# Patient Record
Sex: FEMALE | Race: WHITE
[De-identification: names, ages, dates, MRNs, and addresses within clinical notes are randomized per-mention and may not be internally consistent; named-entity substitution may affect disease eponyms.]

---

## 2018-01-27 ENCOUNTER — HOSPITAL ENCOUNTER (EMERGENCY)
Dept: HOSPITAL 59 - ER | Age: 53
Discharge: HOME | End: 2018-01-27
Payer: COMMERCIAL

## 2018-01-27 DIAGNOSIS — F17.210: ICD-10-CM

## 2018-01-27 DIAGNOSIS — R05: ICD-10-CM

## 2018-01-27 DIAGNOSIS — B34.9: Primary | ICD-10-CM

## 2018-01-27 PROCEDURE — 99283 EMERGENCY DEPT VISIT LOW MDM: CPT

## 2018-01-27 PROCEDURE — 71046 X-RAY EXAM CHEST 2 VIEWS: CPT

## 2018-01-27 NOTE — EMERGENCY DEPARTMENT RECORD
History of Present Illness





- General


Chief complaint: Flu Like Symptoms


Stated complaint: FLU -LIKE SYMPTOMS


Time Seen by Provider: 18 15:08


Source: Patient, RN notes reviewed


Mode of Arrival: Ambulatory





- History of Present Illness


Initial comments: 


cough and congestion and two courses of antibiotics and neg flu test by Dr. Ramirez. and not getting better





Onset/Timin


-: Month(s)


Severity: Moderate


Severity scale (1-10): 7


Quality: Aching


Consistency: Constant





- Rodney Coma Scale


Eye Response: (4) Open spontaneously


Motor Response: (6) Obeys commands


Verbal Response: (5) Oriented


Rodney Total: 15





- Related Data


 Home Medications











 Medication  Instructions  Recorded  Confirmed  Last Taken


 


Albuterol Sulfate 0.083% [Neb] 3 ml NEB .EVERY 4-6 HOURS PRN 18 

1 Day Ago





    ~18


 


Albuterol Sulfate [Proair Hfa] 1 - 2 puff IH .EVERY 4-6 HOURS PRN 18 1 Day Ago





    ~18








 Previous Rx's











 Medication  Instructions  Recorded


 


Prednisone [Prednisone 10Mg] 10 mg PO ASDIR #30 tab 18











 Allergies











Allergy/AdvReac Type Severity Reaction Status Date / Time


 


codeine Allergy Unknown HEADACHE Verified 18 15:50


 


hydrocodone Allergy  VOMITING Verified 18 15:50














Travel Screening





- Travel/Exposure Within Last 30 Days


Have you traveled within the last 30 days?: No





- Travel/Exposure Within Last Year


Have you traveled outside the [place] in the last year?: No





- Additonal Travel Details


Have you been exposed to anyone with a communicable illness?: No





- Travel Symptoms


Symptom Screening: None





Review of Systems


Reviewed: No additional complaints except as noted below


Constitutional: Reports: As per HPI.  Denies: Chills, Fever, Malaise, Night 

sweats, Weakness, Weight change


Eyes: Reports: As per HPI.  Denies: Eye discharge, Eye pain, Photophobia, 

Vision change


ENT: Reports: As per HPI, Congestion.  Denies: Dental pain, Ear pain, Epistaxis

, Hearing loss, Throat pain


Respiratory: Reports: As per HPI, Cough.  Denies: Dyspnea, Hemoptysis, Stridor, 

Wheezes


Cardiovascular: Reports: As per HPI.  Denies: Arrhythmia, Chest pain, Dyspnea 

on exertion, Edema, Murmurs, Orthopnea, Palpitations, Paroxysmal nocturnal 

dyspnea, Rheumatic Fever, Syncope


Endocrine: Reports: As per HPI.  Denies: Fatigue, Heat or cold intolerance, 

Polydipsia, Polyuria


Gastrointestinal: Reports: As per HPI.  Denies: Abdominal pain, Constipation, 

Diarrhea, Hematemesis, Hematochezia, Melena, Nausea, Vomiting


Genitourinary: Reports: As per HPI.  Denies: Abnormal menses, Discharge, 

Dyspareunia, Dysuria, Frequency, Hematuria, Incontinence, Retention, Urgency


Musculoskeletal: Reports: As per HPI.  Denies: Arthralgia, Back pain, Gout, 

Joint swelling, Myalgia, Neck pain


Skin: Reports: As per HPI.  Denies: Bruising, Change in color, Change in hair/

nails, Lesions, Pruritus, Rash


Neurological: Reports: As per HPI.  Denies: Abnormal gait, Confusion, Headache, 

Numbness, Paresthesias, Seizure, Tingling, Tremors, Vertigo, Weakness


Psychiatric: Reports: As per HPI.  Denies: Anxiety, Auditory hallucinations, 

Depression, Homicidal thoughts, Suicidal thoughts, Visual hallucinations


Hematological/Lymphatic: Reports: As per HPI.  Denies: Anemia, Blood Clots, 

Easy bleeding, Easy bruising, Swollen glands





Past Medical History





- SOCIAL HISTORY


Smoking Status: Current every day smoker


Alcohol Use: None


Drug Use: None





- RESPIRATORY


Hx Respiratory Disorders: Yes


Hx Asthma: Yes


Hx Sleep Apnea: Yes


Hx of CPAP: Yes





- CARDIOVASCULAR


Hx Cardio Disorders: No





- NEURO


Hx Neuro Disorders: Yes


Hx Headaches: Yes


Hx of Migraines: Yes (2016)





- GI


Hx GI Disorders: Yes


Hx Reflux: Yes


Hx Nausea/Vomiting: Yes


Comment:: c/o diarrhea





- 


Hx Genitourinary Disorders: Yes


Hx Bladder Problem: Yes


Comment:: LMP 3 years ago





- ENDOCRINE


Hx Endocrine Disorders: No





- MUSCULOSKELETAL


Hx Musculoskeletal Disorders: No





- PSYCH


Hx Psych Problems: Yes


Hx Anxiety: Yes


Hx Depression: Yes





- HEMATOLOGY/ONCOLOGY


Hx Hematology/Oncology Disorders: No





Family Medical History


Any Significant Family History?: Yes


Hx Cancer: Father, Grandparents


Hx Heart Disease: Mother


Hx HTN: Mother





Physical Exam





- General


General Appearance: Alert, Oriented x3, Cooperative, No acute distress





- Head


Head exam: Normal inspection





- Eye


Eye exam: Normal appearance, PERRL


Pupils: Normal accommodation





- ENT


ENT exam: Normal exam, Mucous membranes moist, Normal external ear exam, Normal 

orophraynx, TM's normal bilaterally


Ear exam: Normal external inspection.  negative: External canal tenderness


Nasal Exam: Normal inspection.  negative: Discharge, Sinus tenderness


Mouth exam: Normal external inspection, Tongue normal


Teeth exam: Normal inspection.  negative: Dental caries


Throat exam: Normal inspection.  negative: Tonsillar erythema, Tonsillar exudate





- Neck


Neck exam: Normal inspection, Full ROM.  negative: Tenderness





- Respiratory


Respiratory exam: Normal lung sounds bilaterally.  negative: Respiratory 

distress





- Cardiovascular


Cardiovascular Exam: Regular rate, Normal rhythm, Normal heart sounds





- GI/Abdominal


GI/Abdominal exam: Soft, Normal bowel sounds.  negative: Tenderness





- Rectal


Rectal exam: Deferred





- 


 exam: Deferred





- Extremities


Extremities exam: Normal inspection, Full ROM, Normal capillary refill.  

negative: Tenderness





- Back


Back exam: Reports: Normal inspection, Full ROM.  Denies: Muscle spasm, Rash 

noted, Tenderness





- Neurological


Neurological exam: Alert, Normal gait, Oriented X3, Reflexes normal





- Psychiatric


Psychiatric exam: Normal affect, Normal mood





- Skin


Skin exam: Dry, Intact, Normal color, Warm





Course





 Vital Signs











  18





  14:32


 


Temperature 98.2 F


 


Pulse Rate 74


 


Respiratory 20





Rate 


 


Blood Pressure 121/68


 


Pulse Ox 98














- Reevaluation(s)


Reevaluation #1: 


patient has hyperactive airway and will start prednisone


18 15:49








Medical Decision Making





- Data Complexity


MDM Data: X-Ray Ordered and/or Reviewed (negative chest xray)





Disposition


Clinical Impression: 


 Viral syndrome





Disposition: Home, Self-Care


Condition: (1) Good


Instructions:  Viral Syndrome (ED)


Additional Instructions: 


follow up with Dr. Ramirez in 3 -4 days


prednisone 40 mg for three days than 30 mg for three days than 20 mg for thr 

days than 10 mg for three days


Prescriptions: 


Prednisone [Prednisone 10Mg] 10 mg PO ASDIR #30 tab


Forms:  Patient Portal Access


Time of Disposition: 15:49





Quality





- Quality Measures


Quality Measures: N/A





- Blood Pressure Screening


Does Patient Have Any of the Following: No


Blood Pressure Classification: Pre-Hypertensive BP Reading


Systolic Measurement: 121


Diastolic Measurement: 68


Screening for High Blood Pressure: < Pre-Hypertensive BP, F/U Documented > [

]


Pre-Hypertensive Follow-up Interventions: Referral to alternative/primary care 

provider.

## 2018-01-28 NOTE — RADIOLOGY REPORT
EXAM:  CHEST 2 VIEWS



HISTORY:  PERSISTENT COUGH FOR THE PAST MONTH. BODY ACHES.



TECHNIQUE:  PA and lateral upright views of the chest were obtained.



COMPARISON:  09/29/2014.



FINDINGS:  The heart, mediastinum, and pulmonary vasculature are normal. There 
is minor atelectasis or infiltrate within the lung bases bilaterally. The upper 
lung fields are clear. There is no pneumothorax or effusion. The bones appear 
intact.



IMPRESSION:  



MILD ATELECTASIS OR INFILTRATE AT THE LUNG BASES BILATERALLY, LEFT GREATER THAN 
RIGHT. 



JOB NUMBER:  [id number]
[facility]

## 2018-02-27 ENCOUNTER — HOSPITAL ENCOUNTER (OUTPATIENT)
Dept: HOSPITAL 59 - SUR | Age: 53
Discharge: HOME | End: 2018-02-27
Attending: UROLOGY
Payer: COMMERCIAL

## 2018-02-27 DIAGNOSIS — J45.909: ICD-10-CM

## 2018-02-27 DIAGNOSIS — F41.8: ICD-10-CM

## 2018-02-27 DIAGNOSIS — N39.46: Primary | ICD-10-CM

## 2018-02-27 DIAGNOSIS — F17.210: ICD-10-CM

## 2018-02-27 PROCEDURE — 57288 REPAIR BLADDER DEFECT: CPT

## 2018-02-27 PROCEDURE — 00910 ANES TRANSURETHRAL PX NOS: CPT

## 2018-02-28 NOTE — OPERATIVE NOTE
DATE OF SURGERY: 02/27/2018



PREOPERATIVE DIAGNOSIS: Mixed urinary incontinence, predominantly stress component. 



POSTOPERATIVE DIAGNOSIS: Mixed urinary incontinence, predominantly stress component. 



OPERATION: Transobturator mid urethral sling with Mina mesh and cystoscopy. 



Anesthesia: General. 



Surgeon: Raghu Dillon MD 



Assistant: None. 



Indication: A 52-year-old female with history of incontinence and workup has revealed predominantly 
stress type leaking. We discussed various management options for her incontinence and ultimately she 
has decided to move ahead with a transobturator sling. We discussed the surgery in detail including 
potential risks preoperatively including pain, bleeding, infection, iatrogenic injury, urinary 
retention, recurring incontinence, and mesh-related complications including erosion, especially 
highlighting the fact to her that as a smoker, she is at a heightened risk for wound healing and 
mesh erosion complications. She indicated understanding and wanted to proceed as discussed. 



PROCEDURE: Preop informed consent was obtained. Antibiotics were given. Sedation was administered. 
The patient was brought to the operating room and given general anesthetic and carefully placed in 
the lithotomy position. Genitalia were clipped, prepped and draped and in the usual sterile fashion. 
Meek catheter was placed in the bladder, and the bladder was completely emptied. Stay sutures 2-0 
silk were used to expose the labia and a weighted speculum was placed in the vagina. The bladder 
neck was identified and marked as well as the urethral meatus, and a midline suburethral incision 
was made FCI between the meatus and the bladder neck. This was carried down carefully with 
lateral blunt dissection to expose the space beneath the pubic rami on either side without 
difficulty. The wound was irrigated with antibiotics at that point. Our attention turned to placing 
the needles. 



The transobturator needles, using the helical needles contained in the Mina mesh system were used. 
Thigh incision made just below the attachment of the adductor longus tendon was made on either side 
roughly corresponding to the level of the clitoris. With one finger in the vaginal incision 
underneath the pubic rami, each needle was guided around and underneath the pubic ramus and guided 
out of the vaginal incision taking care to stay within the vaginal incision. After the needles were 
placed, cystoscopy was performed. There was no evidence of any bladder violation. Both ureters were 
identified and were effluxing clear yellow urine. The tape was then secured to each needle and then 
backed out to snug the sling up to the mid urethra. The area was then irrigated with copious amounts 
of antibiotic solution and it appeared that I had good location of the sling and without undue 
tension, but after I released the tapes at the level of the skin incision, it appeared to be that 
the sling was too loose. At that point, I removed that sling and performed the exact procedure all 
over again including the cystoscopy to confirm there was no bladder or ureteral injury. The second 
time the sling was left in excellent position in the mid urethra without any undue tension on the 
urethra but also not loose. This was confirmed using a curved Asher scissors between the urethra and 
the sling. The area was then irrigated out with antibiotics again and the vaginal incision was 
closed with running 2-0 Vicryl. The thigh incisions were closed with Dermabond and the bladder was 
filled with approximately 200 mL of sterile water before removing the Meek catheter and waking the 
patient. She was transferred to recovery in stable condition. Sponge, needle, and instrument counts 
correct at the end of the case. There were no intraoperative complications noted. 



PLAN: The patient will be given a voiding trial in recovery. If she is unable to void successfully, 
Meek catheter will be placed and she will follow up in the office later this week. If she is able 
to void successfully today, then she will be following up in the [facility] in 1 
week to check postvoid residual and review her symptoms. She was given explicit postoperative 
instructions and care instructions, and [place] paperwork was also filled out as well. CC: Dr. Ashley ASENCIO

## 2018-07-13 ENCOUNTER — HOSPITAL ENCOUNTER (OUTPATIENT)
Dept: HOSPITAL 59 - ER | Age: 53
Setting detail: OBSERVATION
LOS: 1 days | Discharge: HOME | End: 2018-07-14
Attending: INTERNAL MEDICINE | Admitting: INTERNAL MEDICINE
Payer: COMMERCIAL

## 2018-07-13 DIAGNOSIS — J45.909: ICD-10-CM

## 2018-07-13 DIAGNOSIS — F41.9: ICD-10-CM

## 2018-07-13 DIAGNOSIS — M19.90: ICD-10-CM

## 2018-07-13 DIAGNOSIS — G47.30: ICD-10-CM

## 2018-07-13 DIAGNOSIS — K21.9: ICD-10-CM

## 2018-07-13 DIAGNOSIS — R79.89: ICD-10-CM

## 2018-07-13 DIAGNOSIS — R07.9: Primary | ICD-10-CM

## 2018-07-13 DIAGNOSIS — F17.210: ICD-10-CM

## 2018-07-13 LAB
ALBUMIN SERPL-MCNC: 4.4 G/DL (ref 4–5)
ALBUMIN/GLOB SERPL: 1.5 {RATIO} (ref 1.1–1.8)
ALP SERPL-CCNC: 89 U/L (ref 35–104)
ALT SERPL-CCNC: 20 U/L (ref ?–33)
ANION GAP SERPL CALC-SCNC: 13 MMOL/L (ref 7–16)
AST SERPL-CCNC: 23 U/L (ref 10–35)
BASOPHILS NFR BLD: 0.5 % (ref 0–6)
BILIRUB SERPL-MCNC: 0.2 MG/DL (ref 0.2–1)
BUN SERPL-MCNC: 15 MG/DL (ref 6–20)
CHOLEST SERPL-MCNC: 219 MG/DL (ref ?–200)
CK MB SERPL-MCNC: 2.4 NG/ML (ref ?–3.77)
CK MB SERPL-MCNC: 3.1 NG/ML (ref ?–3.77)
CO2 SERPL-SCNC: 26 MMOL/L (ref 22–29)
CREAT SERPL-MCNC: 0.8 MG/DL (ref 0.5–0.9)
CREATINE PHOSPHOKINASE: 146 U/L (ref 26–192)
EOSINOPHIL NFR BLD: 1.4 % (ref 0–6)
ERYTHROCYTE [DISTWIDTH] IN BLOOD BY AUTOMATED COUNT: 13.6 % (ref 11.5–14.5)
EST GLOMERULAR FILTRATION RATE: > 60 ML/MIN
GLOBULIN SER-MCNC: 3 GM/DL (ref 1.4–4.8)
GLUCOSE SERPL-MCNC: 87 MG/DL (ref 74–109)
GRANULOCYTES NFR BLD: 52.1 % (ref 47–80)
HCT VFR BLD CALC: 45.3 % (ref 35–47)
HDLC SERPL-MCNC: 70 MG/DL (ref 40–60)
HGB BLD-MCNC: 15 GM/DL (ref 11.6–16)
LDL CHOLESTEROL/MEASURED: 134 MG/DL (ref 0–100)
LYMPHOCYTES NFR BLD AUTO: 36.9 % (ref 16–45)
MCH RBC QN AUTO: 31.9 PG (ref 27–33)
MCHC RBC AUTO-ENTMCNC: 33.1 G/DL (ref 32–36)
MCV RBC AUTO: 96.4 FL (ref 81–97)
MONOCYTES NFR BLD: 9.1 % (ref 0–9)
NTPRO B-NATRIURETIC PEPTIDE: 22.65 PG/ML (ref ?–125)
PLATELET # BLD: 211 K/UL (ref 130–400)
PMV BLD AUTO: 11.4 FL (ref 7.4–10.4)
PROT SERPL-MCNC: 7.4 G/DL (ref 6.6–8.7)
RBC # BLD AUTO: 4.7 M/UL (ref 3.8–5.4)
TRIGL SERPL-MCNC: 267 MG/DL (ref ?–150)
VLDLC SERPL CALC-MCNC: 53 MG/DL (ref 10–40)
WBC # BLD AUTO: 6.3 K/UL (ref 4.2–12.2)

## 2018-07-13 PROCEDURE — 85025 COMPLETE CBC W/AUTO DIFF WBC: CPT

## 2018-07-13 PROCEDURE — 82553 CREATINE MB FRACTION: CPT

## 2018-07-13 PROCEDURE — 99217: CPT

## 2018-07-13 PROCEDURE — 83880 ASSAY OF NATRIURETIC PEPTIDE: CPT

## 2018-07-13 PROCEDURE — 99285 EMERGENCY DEPT VISIT HI MDM: CPT

## 2018-07-13 PROCEDURE — 94761 N-INVAS EAR/PLS OXIMETRY MLT: CPT

## 2018-07-13 PROCEDURE — 84484 ASSAY OF TROPONIN QUANT: CPT

## 2018-07-13 PROCEDURE — 93005 ELECTROCARDIOGRAM TRACING: CPT

## 2018-07-13 PROCEDURE — 99220: CPT

## 2018-07-13 PROCEDURE — 93010 ELECTROCARDIOGRAM REPORT: CPT

## 2018-07-13 PROCEDURE — 80061 LIPID PANEL: CPT

## 2018-07-13 PROCEDURE — 71275 CT ANGIOGRAPHY CHEST: CPT

## 2018-07-13 PROCEDURE — 85379 FIBRIN DEGRADATION QUANT: CPT

## 2018-07-13 PROCEDURE — 80053 COMPREHEN METABOLIC PANEL: CPT

## 2018-07-13 PROCEDURE — 82550 ASSAY OF CK (CPK): CPT

## 2018-07-13 RX ADMIN — ENOXAPARIN SODIUM SCH MG: 40 INJECTION SUBCUTANEOUS at 18:12

## 2018-07-13 RX ADMIN — NICOTINE SCH PATCH: 21 PATCH, EXTENDED RELEASE TOPICAL at 15:21

## 2018-07-13 RX ADMIN — ACETAMINOPHEN PRN MG: 500 TABLET ORAL at 18:17

## 2018-07-13 NOTE — HISTORY & PHYSICAL
History of Present Illness





- Date of Service


Date of Service for History & Physical: 07/13/18





- History of Present Illness


Admitting Diagnosis: chest pain


History of Present Illness: 


Mrs. Hernández is a 54 y/o female with presentation of left sided chest pain 

which began about one week ago. She describes the pain as a pressure and 

bloating which is associated with burping. The patient says that pain has been 

coming and going and she thought that it would resolve but it hasn't. She has 

no previous cardiac history and denies any family history of coronary artery 

disease. She denies palpitations, sweating, headache or palpitations during 

these episodes and her pain goes away spontaneously. The patient is a smoker,

using 1 pack daily for the past 46 years. 





On arrival to [facility] ED the patients's initial workup was insignificant except 

for elevation in D-dimer at 0.57. The patient had a CT of the thorax which is 

negative for PE. Troponin and, ECG and chest xray are not suggestive of acute 

coronary syndrome. The patient's pain has subsided since being administered 

Nitrostat. She will be admitted for serial troponin draws and observation. 





PCP: Dr. Ramirez 





Travel Screening





- Travel/Exposure Within Last 30 Days


Have you traveled within the last 30 days?: No





- Travel/Exposure Within Last Year


Have you traveled outside the [place] in the last year?: No





- Additonal Travel Details


Have you been exposed to anyone with a communicable illness?: No


Exposure Details:: many coworkers are from out of country, not usually working 

floor, but in b





- Travel Symptoms


Symptom Screening: None





Review of Systems


Constitutional: Reports: As per HPI.  Denies: Chills, Fever, Malaise, Night 

sweats, Weakness, Weight change


Eyes: Reports: As per HPI.  Denies: Eye discharge, Eye pain, Photophobia, 

Vision change


ENT: Reports: As per HPI.  Denies: Congestion, Dental pain, Ear pain, Epistaxis

, Hearing loss, Throat pain


Respiratory: Reports: As per HPI.  Denies: Cough, Dyspnea, Hemoptysis, Stridor, 

Wheezes


Cardiovascular: Reports: As per HPI, Chest pain.  Denies: Arrhythmia, Dyspnea 

on exertion, Edema, Murmurs, Orthopnea, Palpitations, Paroxysmal nocturnal 

dyspnea, Rheumatic Fever, Syncope


Endocrine: Reports: As per HPI.  Denies: Fatigue, Heat or cold intolerance, 

Polydipsia, Polyuria


Gastrointestinal: Reports: As per HPI, Nausea.  Denies: Abdominal pain, 

Constipation, Diarrhea, Hematemesis, Hematochezia, Melena, Vomiting


Genitourinary: Reports: As per HPI.  Denies: Abnormal menses, Discharge, 

Dyspareunia, Dysuria, Frequency, Hematuria, Incontinence, Retention, Urgency


Musculoskeletal: Reports: As per HPI.  Denies: Arthralgia, Back pain, Gout, 

Joint swelling, Myalgia, Neck pain


Skin: Reports: As per HPI.  Denies: Bruising, Change in color, Change in hair/

nails, Lesions, Pruritus, Rash


Neurological: Reports: As per HPI.  Denies: Abnormal gait, Confusion, Headache, 

Numbness, Paresthesias, Seizure, Tingling, Tremors, Vertigo, Weakness


Psychiatric: Reports: As per HPI.  Denies: Anxiety, Auditory hallucinations, 

Depression, Homicidal thoughts, Suicidal thoughts, Visual hallucinations


Hematological/Lymphatic: Reports: As per HPI.  Denies: Anemia, Blood Clots, 

Easy bleeding, Easy bruising, Swollen glands





Past Medical History





- SOCIAL HISTORY


Smoking Status: Current every day smoker


Alcohol Use: Occasional


Alcohol Use Comment: mixed


Drug Use: None





- RESPIRATORY


Hx Respiratory Disorders: Yes


Hx Asthma: Yes


Hx Sleep Apnea: Yes


Hx of CPAP: Yes





- CARDIOVASCULAR


Hx Cardio Disorders: Yes


Hx Chest Pain: Yes (this admission)





- NEURO


Hx Neuro Disorders: Yes


Hx of Migraines: Yes





- GI


Hx GI Disorders: Yes


Hx Reflux: Yes





- 


Hx Genitourinary Disorders: Yes


Hx Bladder Problem: Yes





- ENDOCRINE


Hx Endocrine Disorders: No


Hx Diabetes: No


Hx Thyroid Disease: No





- MUSCULOSKELETAL


Hx Musculoskeletal Disorders: Yes


Hx Arthritis: Yes (shoulders and back)





- PSYCH


Hx Psych Problems: Yes


Hx Anxiety: Yes


Hx Depression: Yes


Comment:: medication





- HEMATOLOGY/ONCOLOGY


Hx Hematology/Oncology Disorders: No





Family Medical History


Any Significant Family History?: Yes


Hx Cancer: Father, Grandparents


Hx Heart Disease: Mother


Hx HTN: Mother





H&P Meds/Allergies





- Allergies


Allergies: 


 Allergies











Allergy/AdvReac Type Severity Reaction Status Date / Time


 


codeine Allergy Unknown HEADACHE Verified 07/13/18 10:59


 


hydrocodone Allergy  VOMITING Verified 07/13/18 10:59














- Active Medications


Active Medications: 


 Current Medications





Acetaminophen (Tylenol 500mg Tab)  1,000 mg PO Q6H PRN


   PRN Reason: PAIN - MILD(1-4)/FEVER


Albuterol Sulfate (Ventolin Hfa)  1 - 2 puff INH .EVERY 4-6 HOURS PRN


   PRN Reason: DIFFICULTY IN BREATHING


Albuterol Sulfate ()  2.5 mg INH .EVERY 4-6 HOURS PRN


   PRN Reason: DIFFICULTY IN BREATHING


Aspirin (Ecotrin (Ec))  325 mg PO DAILY [facility]


Duloxetine HCl (Cymbalta)  60 mg PO DAILY [facility]


Nicotine (Nicotine 21mg)  1 patch TD DAILY [facility]


   Last Admin: 07/13/18 15:21 Dose:  1 patch


Nitroglycerin (Nitrostat 0.4mg)  0.4 mg SL Q5MIN PRN


   PRN Reason: CHEST PAIN


Pantoprazole Sodium (Protonix)  40 mg PO DAILY [facility]


Temazepam (Restoril)  15 mg PO QHS PRN


   PRN Reason: INSOMNIA











Physical Exam





- Vital Signs


Vital Signs: 


 Vital Signs - Last 24 Hrs











  Temp Pulse Pulse Pulse Resp BP BP


 


 07/13/18 14:56    72  74  18  


 


 07/13/18 13:57  97.8 F    74  18   148/72


 


 07/13/18 12:51    72   20   142/90


 


 07/13/18 12:06    76   18   126/75


 


 07/13/18 11:30    90   20   127/76


 


 07/13/18 11:28    75   18   132/82


 


 07/13/18 10:55  97.9 F  82    20  145/87 














  Pulse Ox


 


 07/13/18 14:56 


 


 07/13/18 13:57  95


 


 07/13/18 12:51  98


 


 07/13/18 12:06  96


 


 07/13/18 11:30  95


 


 07/13/18 11:28  98


 


 07/13/18 10:55  97














- General


General Appearance: Alert, Oriented x3, Cooperative, Mild distress


Limitations: No limitations





- Head


Head exam: Normal inspection





- Eye


Eye exam: Normal appearance, PERRL, EOMI


Pupils: Normal accommodation





- ENT


ENT exam: Normal exam, Mucous membranes moist, Normal external ear exam, Normal 

orophraynx


Ear exam: Normal external inspection.  negative: External canal tenderness


Nasal Exam: Normal inspection.  negative: Discharge, Sinus tenderness


Mouth exam: Normal external inspection, Tongue normal


Teeth exam: Normal inspection.  negative: Dental caries


Throat exam: Normal inspection.  negative: Tonsillar erythema, Tonsillar exudate





- Neck


Neck exam: Normal inspection, Full ROM.  negative: Tenderness





- Respiratory


Respiratory exam: Normal lung sounds bilaterally.  negative: Respiratory 

distress





- Cardiovascular


Cardiovascular Exam: Regular rate, Normal rhythm, Normal heart sounds


Peripheral Pulses: 3+: Radial (R), Radial (L), Dorsalis Pedis (R), Dorsalis 

Pedis (L)





- GI/Abdominal


GI/Abdominal exam: Soft, Normal bowel sounds.  negative: Tenderness





- Rectal


Rectal exam: Deferred





- 


 exam: Deferred





- Extremities


Extremities exam: Normal inspection, Full ROM, Normal capillary refill.  

negative: Tenderness





- Back


Back exam: Reports: Normal inspection, Full ROM.  Denies: Muscle spasm, Rash 

noted, Tenderness





- Neurological


Neurological exam: Alert, CN II-XII intact, Normal gait, Oriented X3





- Psychiatric


Psychiatric exam: Normal affect, Normal mood





- Skin


Skin exam: Dry, Intact, Normal color, Warm





Results





- Labs


Result Diagrams: 


 07/13/18 11:27





 07/13/18 11:27


Labs Last 24 Hours: 


 Laboratory Results - last 24 hr











  07/13/18 07/13/18 07/13/18





  11:27 11:27 11:27


 


WBC  6.3  


 


RBC  4.70  


 


Hgb  15.0  


 


Hct  45.3  


 


MCV  96.4  


 


MCH  31.9  


 


MCHC  33.1  


 


RDW  13.6  


 


Plt Count  211  


 


MPV  11.4 H  


 


Gran %  52.1  


 


Lymphocytes %  36.9  


 


Monocytes %  9.1 H  


 


Eosinophils %  1.4  


 


Basophils %  0.5  


 


D-Dimer   0.57 


 


Sodium    137


 


Potassium    4.6 H


 


Chloride    98


 


Carbon Dioxide    26.0


 


Anion Gap    13.0


 


BUN    15


 


Creatinine    0.8


 


Estimated GFR    > 60


 


Random Glucose    87


 


Calcium    9.6


 


Total Bilirubin    0.20


 


AST    23


 


ALT    20


 


Alkaline Phosphatase    89


 


Creatine Kinase    146


 


CK-MB (CK-2)    3.1


 


Troponin T    < 0.010


 


NT-Pro-B Natriuret Pep    22.65


 


Total Protein    7.4


 


Albumin    4.4


 


Globulin    3.0


 


Albumin/Globulin Ratio    1.5


 


Triglycerides   


 


Cholesterol   


 


LDL Cholesterol Measurd   


 


VLDL Cholesterol   


 


HDL Cholesterol   














  07/13/18





  11:27


 


WBC 


 


RBC 


 


Hgb 


 


Hct 


 


MCV 


 


MCH 


 


MCHC 


 


RDW 


 


Plt Count 


 


MPV 


 


Gran % 


 


Lymphocytes % 


 


Monocytes % 


 


Eosinophils % 


 


Basophils % 


 


D-Dimer 


 


Sodium 


 


Potassium 


 


Chloride 


 


Carbon Dioxide 


 


Anion Gap 


 


BUN 


 


Creatinine 


 


Estimated GFR 


 


Random Glucose 


 


Calcium 


 


Total Bilirubin 


 


AST 


 


ALT 


 


Alkaline Phosphatase 


 


Creatine Kinase 


 


CK-MB (CK-2) 


 


Troponin T 


 


NT-Pro-B Natriuret Pep 


 


Total Protein 


 


Albumin 


 


Globulin 


 


Albumin/Globulin Ratio 


 


Triglycerides  267 H


 


Cholesterol  219 H


 


LDL Cholesterol Measurd  134.0 H


 


VLDL Cholesterol  53


 


HDL Cholesterol  70 H














VTE H&P Assessment





- Risk for VTE


Risk for VTE: Yes


Risk Level: High


Risk Assessment Date: 07/13/18


Risk Assessment Time: 15:35


VTE Orders Placed or Will Be Placed: Yes





AMI H&P Plan





- AMI


AMI Reason For No ASA Ordered: Not Indicated


AMI Reason For No Statins Ordered: Not Indicated





- EKG Initial


Date: 07/13/18


EKG: No Acute Changes


EKG Detail: No acute ST-T wave changes noted. 





Plan





- Detailed Diagnosis and Plan


(1) Chest pain


Current Visit: Yes   Status: Acute   


Qualifiers: 


   Chest pain type: unspecified   Qualified Code(s): R07.9 - Chest pain, 

unspecified   


Base Code: R07.9 - CHEST PAIN, UNSPECIFIED   Comment: 


7/13/18:


- ECG: NSR, no acute changes identified, Qtc: CXR: negative for acute findings. 


- Troponins x 1 negative, serial trops ordered. 


- on ASA, Nitrostat 0.4mg PRN, continuous cardiac monitoring.    





(2) GERD (gastroesophageal reflux disease)


Current Visit: Yes   Status: Acute   Base Code: K21.9 - GASTRO-ESOPHAGEAL 

REFLUX DISEASE WITHOUT ESOPHAGITIS   Comment: 


7/13/18:


 - Protonix 40mg  PO daily.    





(3) Anxiety


Current Visit: Yes   Status: Acute   Base Code: F41.9 - ANXIETY DISORDER, 

UNSPECIFIED   Comment: 


7/13/18:


- resume Cymbalta, Restoril for sleep PRN.    





(4) Tobacco use


Current Visit: Yes   Status: Acute   Base Code: Z72.0 - TOBACCO USE   Comment: 


7/13/18: 


- 45 pack/yr smoking hx. 


- Nicotine patch 21mg q24H    





(5) Elevated d-dimer


Current Visit: Yes   Status: Acute   Base Code: R79.89 - OTHER SPECIFIED 

ABNORMAL FINDINGS OF BLOOD CHEMISTRY   Comment: 


7/13/18:


- D-dimer 0.57


- CT thorax negative. 


- sats maintained > 94%    





(6) DVT prophylaxis


Current Visit: Yes   Status: Acute   Base Code: NCU5196 -    Comment: 


7/13/18:


- Lovenox 40mg daily, 


   





(7) Full code status


Current Visit: Yes   Status: Acute   Base Code: Z78.9 - OTHER SPECIFIED HEALTH 

STATUS   Comment: 


7/13/18:


 FULL CODE   





- Disposition


Serial trops overnight. D/C in the am if no elevations for outpatient stress 

test with PCP

## 2018-07-13 NOTE — EMERGENCY DEPARTMENT RECORD
History of Present Illness





- General


Chief Complaint: Chest Pain


Stated Complaint: CHEST PRESSURE


Time Seen by Provider: 18 10:54


Source: Patient


Mode of Arrival: Ambulatory


Limitations: No limitations





- History of Present Illness


Initial Comments: 





pt has been having intermittent cp/pressure this week.  today it has lasted 2 

hrs and was up to 7/10.  she also had nausea.  no sob or sweating or radiation.

  her father had an mi in his 40s and she smokes.


MD Complaint: Chest pain


Onset/Timin


-: Week(s)


Pain Location: Left chest


Pain Radiation: Back


Severity: Mild


Quality: Other


Consistency: Constant, Intermittent


Improves With: Nothing


Worsens With: Nothing


Treatments Prior to Arrival: None





- Related Data


 Allergies











Allergy/AdvReac Type Severity Reaction Status Date / Time


 


codeine Allergy Unknown HEADACHE Verified 18 10:59


 


hydrocodone Allergy  VOMITING Verified 18 10:59














Travel Screening





- Travel/Exposure Within Last 30 Days


Have you traveled within the last 30 days?: No





Review of Systems


Reviewed: No additional complaints except as noted below


Constitutional: Reports: As per HPI.  Denies: Chills, Fever, Malaise, Night 

sweats, Weakness, Weight change


Eyes: Reports: As per HPI.  Denies: Eye discharge, Eye pain, Photophobia, 

Vision change


ENT: Reports: As per HPI.  Denies: Congestion, Dental pain, Ear pain, Epistaxis

, Hearing loss, Throat pain


Respiratory: Reports: As per HPI.  Denies: Cough, Dyspnea, Hemoptysis, Stridor, 

Wheezes


Cardiovascular: Reports: As per HPI, Chest pain.  Denies: Arrhythmia, Dyspnea 

on exertion, Edema, Murmurs, Orthopnea, Palpitations, Paroxysmal nocturnal 

dyspnea, Rheumatic Fever, Syncope


Endocrine: Reports: As per HPI.  Denies: Fatigue, Heat or cold intolerance, 

Polydipsia, Polyuria


Gastrointestinal: Reports: As per HPI, Nausea.  Denies: Abdominal pain, 

Constipation, Diarrhea, Hematemesis, Hematochezia, Melena, Vomiting


Genitourinary: Reports: As per HPI.  Denies: Abnormal menses, Discharge, 

Dyspareunia, Dysuria, Frequency, Hematuria, Incontinence, Retention, Urgency


Musculoskeletal: Reports: As per HPI.  Denies: Arthralgia, Back pain, Gout, 

Joint swelling, Myalgia, Neck pain


Skin: Reports: As per HPI.  Denies: Bruising, Change in color, Change in hair/

nails, Lesions, Pruritus, Rash


Neurological: Reports: As per HPI.  Denies: Abnormal gait, Confusion, Headache, 

Numbness, Paresthesias, Seizure, Tingling, Tremors, Vertigo, Weakness


Psychiatric: Reports: As per HPI.  Denies: Anxiety, Auditory hallucinations, 

Depression, Homicidal thoughts, Suicidal thoughts, Visual hallucinations


Hematological/Lymphatic: Reports: As per HPI.  Denies: Anemia, Blood Clots, 

Easy bleeding, Easy bruising, Swollen glands





Past Medical History





- SOCIAL HISTORY


Smoking Status: Current every day smoker


Alcohol Use: Occasional


Drug Use: None





- RESPIRATORY


Hx Respiratory Disorders: Yes


Hx Asthma: Yes


Hx Sleep Apnea: Yes


Hx of CPAP: Yes





- CARDIOVASCULAR


Hx Cardio Disorders: No





- NEURO


Hx Neuro Disorders: Yes


Hx of Migraines: Yes





- GI


Hx GI Disorders: Yes


Hx Reflux: Yes





- 


Hx Genitourinary Disorders: Yes


Hx Bladder Problem: Yes





- ENDOCRINE


Hx Endocrine Disorders: No





- MUSCULOSKELETAL


Hx Musculoskeletal Disorders: Yes


Hx Arthritis: Yes (shoulders and back)





- PSYCH


Hx Psych Problems: Yes


Hx Anxiety: Yes


Hx Depression: Yes





- HEMATOLOGY/ONCOLOGY


Hx Hematology/Oncology Disorders: No





Family Medical History


Any Significant Family History?: Yes


Hx Cancer: Father, Grandparents


Hx Heart Disease: Mother


Hx HTN: Mother





Physical Exam





- General


General Appearance: Alert, Oriented x3, Cooperative, Mild distress





- Head


Head exam: Normal inspection





- Eye


Eye exam: Normal appearance, PERRL, EOMI


Pupils: Normal accommodation





- ENT


ENT exam: Normal exam, Mucous membranes moist, Normal external ear exam, Normal 

orophraynx


Ear exam: Normal external inspection.  negative: External canal tenderness


Nasal Exam: Normal inspection.  negative: Discharge, Sinus tenderness


Mouth exam: Normal external inspection, Tongue normal


Teeth exam: Normal inspection.  negative: Dental caries


Throat exam: Normal inspection.  negative: Tonsillar erythema, Tonsillar exudate





- Neck


Neck exam: Normal inspection, Full ROM.  negative: Tenderness





- Respiratory


Respiratory exam: Normal lung sounds bilaterally.  negative: Respiratory 

distress





- Cardiovascular


Cardiovascular Exam: Regular rate, Normal rhythm, Normal heart sounds





- GI/Abdominal


GI/Abdominal exam: Soft, Normal bowel sounds.  negative: Tenderness





- Rectal


Rectal exam: Deferred





- 


 exam: Deferred





- Extremities


Extremities exam: Normal inspection, Full ROM, Normal capillary refill.  

negative: Tenderness





- Back


Back exam: Reports: Normal inspection, Full ROM.  Denies: Muscle spasm, Rash 

noted, Tenderness





- Neurological


Neurological exam: Alert, CN II-XII intact, Normal gait, Oriented X3





- Psychiatric


Psychiatric exam: Normal affect, Normal mood





- Skin


Skin exam: Dry, Intact, Normal color, Warm





Course





 Vital Signs











  18





  10:55 11:28 11:30


 


Temperature 97.9 F  


 


Pulse Rate 82  


 


Pulse Rate [  75 90





Cardiac Monitor   





]   


 


Respiratory 20 18 20





Rate   


 


Blood Pressure 145/87  


 


Blood Pressure  132/82 127/76





[Right Arm]   


 


Pulse Ox 97 98 95














  18





  12:06 12:51


 


Temperature  


 


Pulse Rate  


 


Pulse Rate [ 76 72





Cardiac Monitor  





]  


 


Respiratory 18 20





Rate  


 


Blood Pressure  


 


Blood Pressure 126/75 142/90





[Right Arm]  


 


Pulse Ox 96 98














- Reevaluation(s)


Reevaluation #1: 





18 13:31


ntg relieved pain





Medical Decision Making





- Lab Data


Result diagrams: 


 18 11:27





 18 11:27





 Lab Results











  18 Range/Units





  11:27 11:27 11:27 


 


WBC  6.3    (4.2-12.2)  K/uL


 


RBC  4.70    (3.80-5.40)  M/uL


 


Hgb  15.0    (11.6-16.0)  gm/dl


 


Hct  45.3    (35.0-47.0)  %


 


MCV  96.4    (81-97)  fl


 


MCH  31.9    (27-33)  pg


 


MCHC  33.1    (32-36)  g/dl


 


RDW  13.6    (11.5-14.5)  %


 


Plt Count  211    (130-400)  K/uL


 


MPV  11.4 H    (7.4-10.4)  fl


 


Gran %  52.1    (47-80)  %


 


Lymphocytes %  36.9    (16-45)  %


 


Monocytes %  9.1 H    (0-9)  %


 


Eosinophils %  1.4    (0-6)  %


 


Basophils %  0.5    (0-6)  %


 


D-Dimer   0.57   (0-0.59)  mg/L FEU


 


Sodium    137  (136-145)  mmol/L


 


Potassium    4.6 H  (3.4-4.5)  mmol/L


 


Chloride    98  ()  mmol/L


 


Carbon Dioxide    26.0  (22-29)  mmol/L


 


Anion Gap    13.0  (7-16)  


 


BUN    15  (6-20)  mg/dL


 


Creatinine    0.8  (0.5-0.9)  mg/dL


 


Estimated GFR    > 60  mL/min


 


Random Glucose    87  ()  mg/dL


 


Calcium    9.6  (8.6-10.0)  mg/dL


 


Total Bilirubin    0.20  (0.2-1.0)  mg/dL


 


AST    23  (10.0-35.0)  U/L


 


ALT    20  (<33)  U/L


 


Alkaline Phosphatase    89  ()  U/L


 


Creatine Kinase    146  ()  U/L


 


CK-MB (CK-2)    3.1  (<3.77)  ng/mL


 


Troponin T    < 0.010  (0-0.010)  ng/mL


 


NT-Pro-B Natriuret Pep    22.65  (<125)  pg/mL


 


Total Protein    7.4  (6.6-8.7)  g/dL


 


Albumin    4.4  (4.0-5.0)  g/dL


 


Globulin    3.0  (1.4-4.8)  gm/dL


 


Albumin/Globulin Ratio    1.5  (1.1-1.8)  














Disposition


Disposition: Admit


Clinical Impression: 


Chest pain


Qualifiers:


 Chest pain type: unspecified Qualified Code(s): R07.9 - Chest pain, unspecified





Disposition: Still a Patient at [facility]


Decision to Admit: Admit from ER


Decision to Admit Date: 18


Decision to Admit Time: 13:32





Quality





- Quality Measures


Quality Measures: N/A





- Blood Pressure Screening


Does Patient Have Any of the Following: No


Blood Pressure Classification: Pre-Hypertensive BP Reading


Systolic Measurement: 145


Diastolic Measurement: 87


Screening for High Blood Pressure: < Pre-Hypertensive BP, F/U Documented > [

]


Pre-Hypertensive Follow-up Interventions: Follow-up with rescreen every year.

## 2018-07-14 LAB — CK MB SERPL-MCNC: 2.1 NG/ML (ref ?–3.77)

## 2018-07-14 RX ADMIN — ENOXAPARIN SODIUM SCH MG: 40 INJECTION SUBCUTANEOUS at 09:22

## 2018-07-14 RX ADMIN — ACETAMINOPHEN PRN MG: 500 TABLET ORAL at 04:46

## 2018-07-14 RX ADMIN — NICOTINE SCH PATCH: 21 PATCH, EXTENDED RELEASE TOPICAL at 09:22

## 2018-07-14 NOTE — DISCHARGE SUMMARY
Providers


Discharge Summary Date: 07/14/18


Date of admission: 


07/13/18 13:45





Attending physician: 


NICOL PRESTON





Primary care physician: 


DELIA GAMING D.O.








Physical Exam





- Vital Signs


Vital Signs: 


 Vital Signs - Last 24 Hrs











  Temp Pulse Pulse Pulse Resp BP BP


 


 07/14/18 04:00  97.7 F    91 H  20   128/83


 


 07/13/18 23:30  97.4 F L    86  20   126/76


 


 07/13/18 21:00     87  16  


 


 07/13/18 20:04  97.8 F    87  20   133/74


 


 07/13/18 16:33      16  


 


 07/13/18 16:04  97.9 F    82  18   132/81


 


 07/13/18 14:56    72  74  18  


 


 07/13/18 13:57  97.8 F    74  18   148/72


 


 07/13/18 12:51    72   20   142/90


 


 07/13/18 12:06    76   18   126/75


 


 07/13/18 11:30    90   20   127/76


 


 07/13/18 11:28    75   18   132/82


 


 07/13/18 10:55  97.9 F  82    20  145/87 














  Pulse Ox


 


 07/14/18 04:00  94 L


 


 07/13/18 23:30  95


 


 07/13/18 21:00 


 


 07/13/18 20:04  94 L


 


 07/13/18 16:33 


 


 07/13/18 16:04  96


 


 07/13/18 14:56 


 


 07/13/18 13:57  95


 


 07/13/18 12:51  98


 


 07/13/18 12:06  96


 


 07/13/18 11:30  95


 


 07/13/18 11:28  98


 


 07/13/18 10:55  97














- General


General Appearance: Alert, Oriented x3, Cooperative, Mild distress


Limitations: No limitations





- Head


Head exam: Normal inspection





- Eye


Eye exam: Normal appearance, PERRL, EOMI


Pupils: Normal accommodation





- ENT


ENT exam: Normal exam, Mucous membranes moist, Normal external ear exam, Normal 

orophraynx


Ear exam: Normal external inspection.  negative: External canal tenderness


Nasal Exam: Normal inspection.  negative: Discharge, Sinus tenderness


Mouth exam: Normal external inspection, Tongue normal


Teeth exam: Normal inspection.  negative: Dental caries


Throat exam: Normal inspection.  negative: Tonsillar erythema, Tonsillar exudate





- Neck


Neck exam: Normal inspection, Full ROM.  negative: Tenderness





- Respiratory


Respiratory exam: Normal lung sounds bilaterally.  negative: Respiratory 

distress





- Cardiovascular


Cardiovascular Exam: Regular rate, Normal rhythm, Normal heart sounds


Peripheral Pulses: 3+: Radial (R), Radial (L), Dorsalis Pedis (R), Dorsalis 

Pedis (L)





- GI/Abdominal


GI/Abdominal exam: Soft, Normal bowel sounds.  negative: Tenderness





- Rectal


Rectal exam: Deferred





- 


 exam: Deferred





- Extremities


Extremities exam: Normal inspection, Full ROM, Normal capillary refill.  

negative: Tenderness





- Back


Back exam: Reports: Normal inspection, Full ROM.  Denies: Muscle spasm, Rash 

noted, Tenderness





- Neurological


Neurological exam: Alert, CN II-XII intact, Normal gait, Oriented X3





- Psychiatric


Psychiatric exam: Normal affect, Normal mood





- Skin


Skin exam: Dry, Intact, Normal color, Warm





Hospitalization





- Hospitalization


Admission Diagnosis: chest pain





- Problem List/Discharge Diagnosis


(1) Chest pain


Current Visit: Yes   Status: Acute   


Discharge Diagnosis: 


   Chest pain type: unspecified   Qualified Code(s): R07.9 - Chest pain, 

unspecified   


Base Code: R07.9 - CHEST PAIN, UNSPECIFIED   Comment: 


7/13/18:


- ECG: NSR, no acute changes identified, Qtc: CXR: negative for acute findings. 


- Troponins x 1 negative, serial trops ordered. 


- on ASA, Nitrostat 0.4mg PRN, continuous cardiac monitoring.    





(2) GERD (gastroesophageal reflux disease)


Current Visit: Yes   Status: Acute   Base Code: K21.9 - GASTRO-ESOPHAGEAL 

REFLUX DISEASE WITHOUT ESOPHAGITIS   Comment: 


7/13/18:


 - Protonix 40mg  PO daily.    





(3) Anxiety


Current Visit: Yes   Status: Acute   Base Code: F41.9 - ANXIETY DISORDER, 

UNSPECIFIED   Comment: 


7/13/18:


- resume Cymbalta, Restoril for sleep PRN.    





(4) Tobacco use


Current Visit: Yes   Status: Acute   Base Code: Z72.0 - TOBACCO USE   Comment: 


7/13/18: 


- 45 pack/yr smoking hx. 


- Nicotine patch 21mg q24H    





(5) Elevated d-dimer


Current Visit: Yes   Status: Acute   Base Code: R79.89 - OTHER SPECIFIED 

ABNORMAL FINDINGS OF BLOOD CHEMISTRY   Comment: 


7/13/18:


- D-dimer 0.57


- CT thorax negative. 


- sats maintained > 94%    





(6) DVT prophylaxis


Current Visit: Yes   Status: Acute   Base Code: RPT1389 -    Comment: 


7/13/18:


- Lovenox 40mg daily, 


   





(7) Full code status


Current Visit: Yes   Status: Acute   Base Code: Z78.9 - OTHER SPECIFIED HEALTH 

STATUS   Comment: 


7/13/18:


 FULL CODE   





- Disposition


Serial trops overnight. D/C in the am if no elevations for outpatient stress 

test with PCP 





- Hospitalization Course


Hospital Course: 


Mrs. Hernández is a 54 y/o female with presentation of left sided chest pain 

which began about one week ago. She describes the pain as a pressure and 

bloating which is associated with burping. The patient says that pain has been 

coming and going and she thought that it would resolve but it hasn't. She has 

no previous cardiac history and denies any family history of coronary artery 

disease. She denies palpitations, sweating, headache or palpitations during 

these episodes and her pain goes away spontaneously. The patient is a smoker,

using 1 pack daily for the past 46 years. 





On arrival to [facility] ED the patients's initial workup was insignificant except 

for elevation in D-dimer at 0.57. The patient had a CT of the thorax which is 

negative for PE. Troponin and, ECG and chest xray are not suggestive of acute 

coronary syndrome. The patient's pain has subsided since being administered 

Nitrostat. She will be admitted for serial troponin draws and observation. 





7/14/18: The patient is awake, alert and oriented. She says that her chest pain 

has resolved and that she has no shortness of breath. cardiac monitor overnight 

did not show any acute changes and serial troponins are negative. 





PCP: Dr. Gaming 


Procedures: 


Imaging and X-Rays





07/13/18 12:18


CHEST CTA w contrast [CTA] Stat 








Cardiology Procedures





07/13/18 11:03


EKG NOW 





07/13/18 11:12


Cardiac Monitor NOW 





07/13/18 14:04


EKG QDX2@0600 











Abnormal Labs: 


 Abnormal Lab Results











  07/13/18 07/13/18 07/13/18 Range/Units





  11:27 11:27 11:27 


 


MPV  11.4 H    (7.4-10.4)  fl


 


Monocytes %  9.1 H    (0-9)  %


 


Potassium   4.6 H   (3.4-4.5)  mmol/L


 


Triglycerides    267 H  (<150)  mg/dL


 


Cholesterol    219 H  (<200)  mg/dL


 


LDL Cholesterol Measurd    134.0 H  (0-100)  mg/dL


 


HDL Cholesterol    70 H  (40-60)  mg/dL














Discharge Medications





- Discharge Medications


Home Medications: 


 Ambulatory Orders





Duloxetine HCl 60 mg PO DAILY 12/28/17 [Last Taken 1 Day Ago ~01/26/18]











Discharge Plan





- Discharge Instructions


Activity at Discharge: Resume Usual Activities As Tolerated


Diet at Discharge: Regular Diet


Additional Instructions: 








Follow up with your PCP: Dr. Gaming this week for possible stress test. 





Try to decrease and eventually stop smoking. 





If your symptoms return please go to your nearest ED. 





Quality Measures





- Quality Measures


Quality Measures: Documentation of Current Medications in Medical Record, 

Screening for High Blood Pressure and F/U Documented





- Current Medications


Quality Measure: Measure #130: Documentation of Current Medications


Documentation of Current Medications: <Current Medications Documented/Reviewed> 

[]





- Blood Pressure Screening


Quality Measure: Screening for High Blood Pressure and Follow-Up Documented


Does Patient Have Any of the Following: No


Blood Pressure Classification: Pre-Hypertensive BP Reading


Systolic Measurement: 145


Diastolic Measurement: 87


Screening for High Blood Pressure: < Pre-Hypertensive BP, F/U Documented > [

]


Pre-Hypertensive Follow-up Interventions: Follow-up with rescreen every year., 

Lifestyle modifications.


Lifestyle Modification: Weight Reduction, Dietary Approaches to Stop 

Hypertension (DASH) Eating Plan





- Elder Abuse Suspicion Index


EASI Reference Information: Adalid ZUNIGA, Michelle C, Meg D, Erika RODRIGUEZ.Development and validation of a tool to assist physicians identification of 

elder abuse: The Elder Abuse Suspicion  Index (EASI ).  Journal of Elder Abuse 

and Neglect, 2008; 20 (3): 276-300.

## 2018-07-14 NOTE — CT ANGIOGRAM REPORT
DATE:  07/13/2018.



EXAM:  CT ANGIOGRAM OF THE CHEST WITH CONTRAST.



HISTORY:  Difficulty in breathing.



TECHNIQUE:  CTA of the chest was performed after the intravenous administration 
of 80 mL of Omnipaque 350 contrast material.  Sagittal and coronal MIP images 
were performed on an independent workstation.



FINDINGS:  There is no mass or filling defect to suggest pulmonary embolism.  
The heart size is normal.  No pericardial effusion.  Mild underlying central 
lobular emphysematous change.  No infiltrate or pleural effusion.



IMPRESSION:  

1.  NO CTA FINDINGS SUGGESTIVE OF PULMONARY EMBOLISM.

2.  MILD UNDERLYING CENTRAL LOBULAR EMPHYSEMATOUS CHANGE.

3.  NO INFILTRATE OR PLEURAL EFFUSION.



JOB NUMBER:  [id number]
[facility]